# Patient Record
Sex: MALE | Race: OTHER
[De-identification: names, ages, dates, MRNs, and addresses within clinical notes are randomized per-mention and may not be internally consistent; named-entity substitution may affect disease eponyms.]

---

## 2021-08-16 ENCOUNTER — HOSPITAL ENCOUNTER (INPATIENT)
Dept: HOSPITAL 54 - ICU | Age: 86
LOS: 3 days | Discharge: HOME HEALTH | DRG: 280 | End: 2021-08-19
Attending: NURSE PRACTITIONER | Admitting: NURSE PRACTITIONER
Payer: MEDICARE

## 2021-08-16 VITALS — HEIGHT: 65 IN | BODY MASS INDEX: 25.85 KG/M2 | WEIGHT: 155.13 LBS

## 2021-08-16 DIAGNOSIS — L89.156: ICD-10-CM

## 2021-08-16 DIAGNOSIS — E78.5: ICD-10-CM

## 2021-08-16 DIAGNOSIS — I25.10: Primary | ICD-10-CM

## 2021-08-16 DIAGNOSIS — D63.8: ICD-10-CM

## 2021-08-16 DIAGNOSIS — I21.A1: ICD-10-CM

## 2021-08-16 DIAGNOSIS — F03.90: ICD-10-CM

## 2021-08-16 DIAGNOSIS — Z99.2: ICD-10-CM

## 2021-08-16 DIAGNOSIS — D63.1: ICD-10-CM

## 2021-08-16 DIAGNOSIS — J44.9: ICD-10-CM

## 2021-08-16 DIAGNOSIS — G93.41: ICD-10-CM

## 2021-08-16 DIAGNOSIS — N18.6: ICD-10-CM

## 2021-08-16 DIAGNOSIS — Z79.4: ICD-10-CM

## 2021-08-16 DIAGNOSIS — F17.210: ICD-10-CM

## 2021-08-16 DIAGNOSIS — I12.0: ICD-10-CM

## 2021-08-16 DIAGNOSIS — I42.9: ICD-10-CM

## 2021-08-16 DIAGNOSIS — Z95.5: ICD-10-CM

## 2021-08-16 DIAGNOSIS — E11.22: ICD-10-CM

## 2021-08-16 DIAGNOSIS — E83.9: ICD-10-CM

## 2021-08-16 PROCEDURE — C1894 INTRO/SHEATH, NON-LASER: HCPCS

## 2021-08-16 PROCEDURE — A6248 HYDROGEL DRSG GEL FILLER: HCPCS

## 2021-08-16 PROCEDURE — G0500 MOD SEDAT ENDO SERVICE >5YRS: HCPCS

## 2021-08-16 PROCEDURE — C1887 CATHETER, GUIDING: HCPCS

## 2021-08-16 PROCEDURE — G0378 HOSPITAL OBSERVATION PER HR: HCPCS

## 2021-08-16 PROCEDURE — A6403 STERILE GAUZE>16 <= 48 SQ IN: HCPCS

## 2021-08-17 VITALS — SYSTOLIC BLOOD PRESSURE: 125 MMHG | DIASTOLIC BLOOD PRESSURE: 56 MMHG

## 2021-08-17 VITALS — SYSTOLIC BLOOD PRESSURE: 120 MMHG | DIASTOLIC BLOOD PRESSURE: 53 MMHG

## 2021-08-17 VITALS — SYSTOLIC BLOOD PRESSURE: 111 MMHG | DIASTOLIC BLOOD PRESSURE: 23 MMHG

## 2021-08-17 VITALS — DIASTOLIC BLOOD PRESSURE: 53 MMHG | SYSTOLIC BLOOD PRESSURE: 122 MMHG

## 2021-08-17 VITALS — DIASTOLIC BLOOD PRESSURE: 52 MMHG | SYSTOLIC BLOOD PRESSURE: 107 MMHG

## 2021-08-17 VITALS — DIASTOLIC BLOOD PRESSURE: 91 MMHG | SYSTOLIC BLOOD PRESSURE: 147 MMHG

## 2021-08-17 VITALS — DIASTOLIC BLOOD PRESSURE: 37 MMHG | SYSTOLIC BLOOD PRESSURE: 120 MMHG

## 2021-08-17 VITALS — SYSTOLIC BLOOD PRESSURE: 118 MMHG | DIASTOLIC BLOOD PRESSURE: 73 MMHG

## 2021-08-17 VITALS — DIASTOLIC BLOOD PRESSURE: 60 MMHG | SYSTOLIC BLOOD PRESSURE: 120 MMHG

## 2021-08-17 VITALS — SYSTOLIC BLOOD PRESSURE: 104 MMHG | DIASTOLIC BLOOD PRESSURE: 60 MMHG

## 2021-08-17 VITALS — DIASTOLIC BLOOD PRESSURE: 37 MMHG | SYSTOLIC BLOOD PRESSURE: 94 MMHG

## 2021-08-17 VITALS — SYSTOLIC BLOOD PRESSURE: 128 MMHG | DIASTOLIC BLOOD PRESSURE: 54 MMHG

## 2021-08-17 VITALS — DIASTOLIC BLOOD PRESSURE: 71 MMHG | SYSTOLIC BLOOD PRESSURE: 119 MMHG

## 2021-08-17 VITALS — DIASTOLIC BLOOD PRESSURE: 75 MMHG | SYSTOLIC BLOOD PRESSURE: 118 MMHG

## 2021-08-17 VITALS — DIASTOLIC BLOOD PRESSURE: 36 MMHG | SYSTOLIC BLOOD PRESSURE: 122 MMHG

## 2021-08-17 VITALS — DIASTOLIC BLOOD PRESSURE: 59 MMHG | SYSTOLIC BLOOD PRESSURE: 151 MMHG

## 2021-08-17 VITALS — SYSTOLIC BLOOD PRESSURE: 138 MMHG | DIASTOLIC BLOOD PRESSURE: 71 MMHG

## 2021-08-17 VITALS — DIASTOLIC BLOOD PRESSURE: 60 MMHG | SYSTOLIC BLOOD PRESSURE: 103 MMHG

## 2021-08-17 VITALS — DIASTOLIC BLOOD PRESSURE: 51 MMHG | SYSTOLIC BLOOD PRESSURE: 128 MMHG

## 2021-08-17 VITALS — DIASTOLIC BLOOD PRESSURE: 52 MMHG | SYSTOLIC BLOOD PRESSURE: 116 MMHG

## 2021-08-17 VITALS — DIASTOLIC BLOOD PRESSURE: 51 MMHG | SYSTOLIC BLOOD PRESSURE: 115 MMHG

## 2021-08-17 VITALS — SYSTOLIC BLOOD PRESSURE: 138 MMHG | DIASTOLIC BLOOD PRESSURE: 62 MMHG

## 2021-08-17 VITALS — SYSTOLIC BLOOD PRESSURE: 115 MMHG | DIASTOLIC BLOOD PRESSURE: 39 MMHG

## 2021-08-17 VITALS — SYSTOLIC BLOOD PRESSURE: 151 MMHG | DIASTOLIC BLOOD PRESSURE: 59 MMHG

## 2021-08-17 VITALS — SYSTOLIC BLOOD PRESSURE: 128 MMHG | DIASTOLIC BLOOD PRESSURE: 51 MMHG

## 2021-08-17 VITALS — DIASTOLIC BLOOD PRESSURE: 41 MMHG | SYSTOLIC BLOOD PRESSURE: 100 MMHG

## 2021-08-17 VITALS — DIASTOLIC BLOOD PRESSURE: 49 MMHG | SYSTOLIC BLOOD PRESSURE: 120 MMHG

## 2021-08-17 VITALS — DIASTOLIC BLOOD PRESSURE: 87 MMHG | SYSTOLIC BLOOD PRESSURE: 137 MMHG

## 2021-08-17 VITALS — SYSTOLIC BLOOD PRESSURE: 122 MMHG | DIASTOLIC BLOOD PRESSURE: 52 MMHG

## 2021-08-17 VITALS — SYSTOLIC BLOOD PRESSURE: 130 MMHG | DIASTOLIC BLOOD PRESSURE: 61 MMHG

## 2021-08-17 VITALS — SYSTOLIC BLOOD PRESSURE: 108 MMHG | DIASTOLIC BLOOD PRESSURE: 50 MMHG

## 2021-08-17 VITALS — SYSTOLIC BLOOD PRESSURE: 127 MMHG | DIASTOLIC BLOOD PRESSURE: 43 MMHG

## 2021-08-17 VITALS — SYSTOLIC BLOOD PRESSURE: 115 MMHG | DIASTOLIC BLOOD PRESSURE: 38 MMHG

## 2021-08-17 VITALS — DIASTOLIC BLOOD PRESSURE: 61 MMHG | SYSTOLIC BLOOD PRESSURE: 115 MMHG

## 2021-08-17 LAB
ALBUMIN SERPL BCP-MCNC: 2.4 G/DL (ref 3.4–5)
ALP SERPL-CCNC: 76 U/L (ref 46–116)
ALT SERPL W P-5'-P-CCNC: 8 U/L (ref 12–78)
AST SERPL W P-5'-P-CCNC: 17 U/L (ref 15–37)
BASOPHILS # BLD AUTO: 0 K/UL (ref 0–0.2)
BASOPHILS NFR BLD AUTO: 0.3 % (ref 0–2)
BILIRUB SERPL-MCNC: 0.3 MG/DL (ref 0.2–1)
BUN SERPL-MCNC: 41 MG/DL (ref 7–18)
CALCIUM SERPL-MCNC: 8.6 MG/DL (ref 8.5–10.1)
CHLORIDE SERPL-SCNC: 103 MMOL/L (ref 98–107)
CHOLEST SERPL-MCNC: 81 MG/DL (ref ?–200)
CO2 SERPL-SCNC: 24 MMOL/L (ref 21–32)
CREAT SERPL-MCNC: 6.4 MG/DL (ref 0.6–1.3)
EOSINOPHIL NFR BLD AUTO: 1.4 % (ref 0–6)
GLUCOSE SERPL-MCNC: 102 MG/DL (ref 74–106)
HCT VFR BLD AUTO: 28 % (ref 39–51)
HDLC SERPL-MCNC: 24 MG/DL (ref 40–60)
HGB BLD-MCNC: 8.9 G/DL (ref 13.5–17.5)
LDLC SERPL DIRECT ASSAY-MCNC: 40 MG/DL (ref 0–99)
LYMPHOCYTES NFR BLD AUTO: 0.8 K/UL (ref 0.8–4.8)
LYMPHOCYTES NFR BLD AUTO: 12.3 % (ref 20–44)
MAGNESIUM SERPL-MCNC: 2 MG/DL (ref 1.8–2.4)
MCHC RBC AUTO-ENTMCNC: 32 G/DL (ref 31–36)
MCV RBC AUTO: 103 FL (ref 80–96)
MONOCYTES NFR BLD AUTO: 0.6 K/UL (ref 0.1–1.3)
MONOCYTES NFR BLD AUTO: 9.6 % (ref 2–12)
NEUTROPHILS # BLD AUTO: 4.7 K/UL (ref 1.8–8.9)
NEUTROPHILS NFR BLD AUTO: 76.4 % (ref 43–81)
PHOSPHATE SERPL-MCNC: 4.2 MG/DL (ref 2.5–4.9)
PLATELET # BLD AUTO: 156 K/UL (ref 150–450)
POTASSIUM SERPL-SCNC: 4.7 MMOL/L (ref 3.5–5.1)
PROT SERPL-MCNC: 6.2 G/DL (ref 6.4–8.2)
RBC # BLD AUTO: 2.7 MIL/UL (ref 4.5–6)
SODIUM SERPL-SCNC: 138 MMOL/L (ref 136–145)
TRIGL SERPL-MCNC: 97 MG/DL (ref 30–150)
TSH SERPL DL<=0.005 MIU/L-ACNC: 0.69 UIU/ML (ref 0.36–3.74)
WBC NRBC COR # BLD AUTO: 6.2 K/UL (ref 4.3–11)

## 2021-08-17 RX ADMIN — Medication SCH TAB: at 07:38

## 2021-08-17 RX ADMIN — RENAGEL SCH MG: 800 TABLET ORAL at 07:37

## 2021-08-17 RX ADMIN — ATORVASTATIN CALCIUM SCH MG: 40 TABLET, FILM COATED ORAL at 21:57

## 2021-08-17 RX ADMIN — FAMOTIDINE SCH MG: 20 TABLET, FILM COATED ORAL at 07:38

## 2021-08-17 RX ADMIN — METOPROLOL TARTRATE SCH MG: 50 TABLET, FILM COATED ORAL at 17:00

## 2021-08-17 RX ADMIN — METOPROLOL TARTRATE SCH MG: 50 TABLET, FILM COATED ORAL at 10:56

## 2021-08-17 RX ADMIN — RENAGEL SCH MG: 800 TABLET ORAL at 17:36

## 2021-08-17 RX ADMIN — RENAGEL SCH MG: 800 TABLET ORAL at 13:00

## 2021-08-17 RX ADMIN — DOCUSATE SODIUM SCH MG: 250 CAPSULE, LIQUID FILLED ORAL at 07:38

## 2021-08-17 RX ADMIN — Medication SCH OZ: at 10:56

## 2021-08-17 RX ADMIN — Medication SCH MG: at 10:55

## 2021-08-17 NOTE — NUR
RN NOTE



PATIENT ALERT AND RESPONSIVE, ON O2 2L VIA NASAL CANNULA. O2 SAT 98%. NO SIGNS OF ANY 
RESPIRATORY DISTRESS. KEPT CLEAN AND DRY. VOIDED X2. ALL NEEDS ATTENDED PROMPTLY. BED LOCKED 
AND IN LOWEST POSITION. CALL LIGHT WITHIN REACH. ENDORSED TO AM SHIFT AND ENDORSED REGARDING 
PATIENTS BELONGINGS (DENTURES, RIGHT EAR HEARING AID, CELLPHONE).

## 2021-08-17 NOTE — NUR
PT APPEARS TO BE COOPERATIVE AT THIS TIME.  RESTRAINTS REMOVED

-------------------------------------------------------------------------------

Addendum: 08/17/21 at 1733 by PRATIMA MORA RN

-------------------------------------------------------------------------------

Amended: Links added.

## 2021-08-17 NOTE — NUR
ADMIT NOTE



RECEIVED PATIENT VIA GURNEY ACCOMPANIED BY SHAREE SIMEON AND TWO EMT'S. PATIENT SAFELY TRANSFERRED 
TO BED. TELE MONITOR ON, HR 60'S. PATIENT IS ALERT AND ORIENTED X 1, CONFUSED. ON O2 2L VIA 
NASAL CANNULA, NO SIGNS OF ANY RESPIRATORY DISTRESS. DENIES ANY PAIN AT THIS TIME. ADMITTING 
DX: NSTEMI. HX: 2 STENTS RCA, ESRD (T,TH,SAT), CAD, HLD, AND SMOKER. WITH RIGHT CHEST 
PERMACATH, DRESSING DRY AND INTACT. SKIN ASSESSMENT DONE, NOTED WITH SCROTUM REDNESS, LEFT 
BUTTOCK REDNESS, SACRUM SCAB, LEFT UPPER ARM WOUND,   IV ACCESS ON RIGHT AC #20 INFUSING 
ANGIOMAX 15ML/HR. AND RIGHT HAND #22 PATENT AND INTACT. ALL ORDERS REVIEWED AND VERIFIED 
WITH DR. ROSAS, ORDERS TO CONTINUE CURRENT MEDS. BED LOCKED AND IN LOWEST POSITION. CALL 
LIGHT WITHIN REACH. ALL NEEDS ANTICIPATED. 

-------------------------------------------------------------------------------

Addendum: 08/17/21 at 0330 by MANUEL JAMES RN

-------------------------------------------------------------------------------

PATIENT ADMITTED AT 0010.

## 2021-08-17 NOTE — NUR
ICU/LVN

CALLED THE CARDIOLOGIST ABOUT TURNING OFF ANGIOMAX 2-3 HRS BEFORE PROCEDURE AT 1300. DR LAND SAID TO CALL THE CARDIOLOGIST TOMORROW TO ADDRESS THE TURNING OFF THE ANGIOMAX. 
WILL PASS ON TO DAY SHIFT ABOUT THIS.

## 2021-08-17 NOTE — NUR
OPENING NOTE:  REPORT RECEIVED FROM LARS SIMEON.  PT ATTEMPTED TO SLAP AND HIT RN MULTIPLE 
TIMES DURING ASSESSMENT OF IV SITE.  4 STAFF MEMBERS REQUIRED TO CALM PATIENT DOWN AND APPLY 
RESTRAINTS.  IV SITE WAS ASSESSED, COBAN DRESSING REMOVED FROM RIGHT WRIST AND FOREARM, 
APPEARED TO BE SNUG, PRESENT WHEN PATIENT ARRIVED FROM [facility] ACCORDING TO REPORT.  
WOUND CARE RN AT BEDSIDE ASSESSING WOUNDS WHILE OTHER STAFF AT BEDSIDE.  PT CHECKED ON 
HOURLY AND PRN BY NURSING STAFF.

## 2021-08-17 NOTE — NUR
RECEIVED PATIENT ON ANGIOMAX @ 15ML/HR. DR. ROSAS AWARE AND WITH ORDERS TO CONTINUE CURRENT 
MEDICATION. LORIE FROM [place] UNABLE TO VERIFY MEDICATION AND TO HAVE [facility] DAYSHIFT 
PHARMACY TO VERIFY MEDICATION. CHARGE NURSE JEFFREY AWARE.

## 2021-08-17 NOTE — NUR
END OF SHIFT NOTE:  PT IS SCHEDULED FOR HEART CATH AT 1300 TOMORROW.  PT ATE ALL OF DINNER 
WITH MINIMAL ASSISTANCE.  PT APPEARS COOPERATIVE AT THIS TIME.  PT CHECKED ON HOURLY AND PRN 
BY NURSING STAFF.

## 2021-08-17 NOTE — NUR
PT APPEARS TO BE CALMER AT THIS TIME.  PT WAS COOPERATIVE AT 1200 ROUNDS.  CONTINUE TO WAIT 
FOR DR HOOPER TO CONFIRM IF THE ANGIOMAX GTT SHOULD BE INFUSING OR NOW.  RN SPOKE TO PATIENTS 
SON ADÁN, TELEPHONE CONSENT RECEIVED FOR HEART CATH AND FOR ANESTHESIA FOR PROCEDURE.  WILL 
CONITNUE TO MONITOR PATIENT CLOSELY.

## 2021-08-17 NOTE — NUR
PER DR. GIBSON KEEP ANGIOMAX INFUSING AT 0.1MG/KG/HR TILL HEART CATH.  HEART CATH SCHEDULED 
FOR TOMORROW AT 1PM PER DR GIBSON.  DR. HOOPER NOTIFIED.  ORDERS GIVEN FOR DIET FOR PATIENT 
UNTIL MIDNIGHT.

## 2021-08-17 NOTE — NUR
WOUND CARE CONSULT: LIMITED ASSESSMENT DUE TO PT VERY COMBATIVE. LEFT UPPER ARM WOUND NOTED 
WITH SEROSANGUINOUS DRAINAGE. SACRAL WOUND NOTED IN ADMISSION PHOTO. SURGICAL CONSULT CALLED 
TO DR SINGH. RECOMMENDATIONS MADE FOR SKIN PROTECTION. DISCUSSED WITH NURSING STAFF. MD 
IN AGREEMENT WITH PLAN OF CARE.

## 2021-08-18 VITALS — SYSTOLIC BLOOD PRESSURE: 136 MMHG | DIASTOLIC BLOOD PRESSURE: 78 MMHG

## 2021-08-18 VITALS — SYSTOLIC BLOOD PRESSURE: 152 MMHG | DIASTOLIC BLOOD PRESSURE: 58 MMHG

## 2021-08-18 VITALS — SYSTOLIC BLOOD PRESSURE: 146 MMHG | DIASTOLIC BLOOD PRESSURE: 57 MMHG

## 2021-08-18 VITALS — DIASTOLIC BLOOD PRESSURE: 67 MMHG | SYSTOLIC BLOOD PRESSURE: 122 MMHG

## 2021-08-18 VITALS — DIASTOLIC BLOOD PRESSURE: 76 MMHG | SYSTOLIC BLOOD PRESSURE: 120 MMHG

## 2021-08-18 VITALS — DIASTOLIC BLOOD PRESSURE: 62 MMHG | SYSTOLIC BLOOD PRESSURE: 136 MMHG

## 2021-08-18 VITALS — DIASTOLIC BLOOD PRESSURE: 76 MMHG | SYSTOLIC BLOOD PRESSURE: 206 MMHG

## 2021-08-18 VITALS — DIASTOLIC BLOOD PRESSURE: 43 MMHG | SYSTOLIC BLOOD PRESSURE: 120 MMHG

## 2021-08-18 VITALS — DIASTOLIC BLOOD PRESSURE: 61 MMHG | SYSTOLIC BLOOD PRESSURE: 137 MMHG

## 2021-08-18 VITALS — DIASTOLIC BLOOD PRESSURE: 79 MMHG | SYSTOLIC BLOOD PRESSURE: 95 MMHG

## 2021-08-18 VITALS — DIASTOLIC BLOOD PRESSURE: 68 MMHG | SYSTOLIC BLOOD PRESSURE: 161 MMHG

## 2021-08-18 VITALS — DIASTOLIC BLOOD PRESSURE: 66 MMHG | SYSTOLIC BLOOD PRESSURE: 164 MMHG

## 2021-08-18 VITALS — SYSTOLIC BLOOD PRESSURE: 144 MMHG | DIASTOLIC BLOOD PRESSURE: 71 MMHG

## 2021-08-18 VITALS — SYSTOLIC BLOOD PRESSURE: 105 MMHG | DIASTOLIC BLOOD PRESSURE: 59 MMHG

## 2021-08-18 VITALS — SYSTOLIC BLOOD PRESSURE: 117 MMHG | DIASTOLIC BLOOD PRESSURE: 65 MMHG

## 2021-08-18 VITALS — SYSTOLIC BLOOD PRESSURE: 143 MMHG | DIASTOLIC BLOOD PRESSURE: 57 MMHG

## 2021-08-18 VITALS — SYSTOLIC BLOOD PRESSURE: 135 MMHG | DIASTOLIC BLOOD PRESSURE: 66 MMHG

## 2021-08-18 VITALS — SYSTOLIC BLOOD PRESSURE: 157 MMHG | DIASTOLIC BLOOD PRESSURE: 97 MMHG

## 2021-08-18 VITALS — SYSTOLIC BLOOD PRESSURE: 146 MMHG | DIASTOLIC BLOOD PRESSURE: 64 MMHG

## 2021-08-18 VITALS — SYSTOLIC BLOOD PRESSURE: 146 MMHG | DIASTOLIC BLOOD PRESSURE: 66 MMHG

## 2021-08-18 VITALS — SYSTOLIC BLOOD PRESSURE: 131 MMHG | DIASTOLIC BLOOD PRESSURE: 67 MMHG

## 2021-08-18 VITALS — DIASTOLIC BLOOD PRESSURE: 75 MMHG | SYSTOLIC BLOOD PRESSURE: 142 MMHG

## 2021-08-18 VITALS — DIASTOLIC BLOOD PRESSURE: 55 MMHG | SYSTOLIC BLOOD PRESSURE: 131 MMHG

## 2021-08-18 VITALS — SYSTOLIC BLOOD PRESSURE: 129 MMHG | DIASTOLIC BLOOD PRESSURE: 57 MMHG

## 2021-08-18 VITALS — SYSTOLIC BLOOD PRESSURE: 151 MMHG | DIASTOLIC BLOOD PRESSURE: 64 MMHG

## 2021-08-18 VITALS — SYSTOLIC BLOOD PRESSURE: 108 MMHG | DIASTOLIC BLOOD PRESSURE: 53 MMHG

## 2021-08-18 VITALS — SYSTOLIC BLOOD PRESSURE: 143 MMHG | DIASTOLIC BLOOD PRESSURE: 66 MMHG

## 2021-08-18 VITALS — SYSTOLIC BLOOD PRESSURE: 120 MMHG | DIASTOLIC BLOOD PRESSURE: 76 MMHG

## 2021-08-18 VITALS — SYSTOLIC BLOOD PRESSURE: 118 MMHG | DIASTOLIC BLOOD PRESSURE: 50 MMHG

## 2021-08-18 VITALS — SYSTOLIC BLOOD PRESSURE: 147 MMHG | DIASTOLIC BLOOD PRESSURE: 65 MMHG

## 2021-08-18 VITALS — SYSTOLIC BLOOD PRESSURE: 136 MMHG | DIASTOLIC BLOOD PRESSURE: 76 MMHG

## 2021-08-18 VITALS — SYSTOLIC BLOOD PRESSURE: 129 MMHG | DIASTOLIC BLOOD PRESSURE: 60 MMHG

## 2021-08-18 VITALS — DIASTOLIC BLOOD PRESSURE: 83 MMHG | SYSTOLIC BLOOD PRESSURE: 156 MMHG

## 2021-08-18 LAB
BASOPHILS # BLD AUTO: 0 K/UL (ref 0–0.2)
BASOPHILS NFR BLD AUTO: 0.2 % (ref 0–2)
BUN SERPL-MCNC: 55 MG/DL (ref 7–18)
CALCIUM SERPL-MCNC: 8.7 MG/DL (ref 8.5–10.1)
CHLORIDE SERPL-SCNC: 103 MMOL/L (ref 98–107)
CO2 SERPL-SCNC: 25 MMOL/L (ref 21–32)
CREAT SERPL-MCNC: 7.5 MG/DL (ref 0.6–1.3)
EOSINOPHIL NFR BLD AUTO: 1.7 % (ref 0–6)
GLUCOSE SERPL-MCNC: 129 MG/DL (ref 74–106)
HCT VFR BLD AUTO: 28 % (ref 39–51)
HGB BLD-MCNC: 8.8 G/DL (ref 13.5–17.5)
LYMPHOCYTES NFR BLD AUTO: 0.7 K/UL (ref 0.8–4.8)
LYMPHOCYTES NFR BLD AUTO: 11.5 % (ref 20–44)
MAGNESIUM SERPL-MCNC: 1.8 MG/DL (ref 1.8–2.4)
MCHC RBC AUTO-ENTMCNC: 32 G/DL (ref 31–36)
MCV RBC AUTO: 102 FL (ref 80–96)
MONOCYTES NFR BLD AUTO: 0.7 K/UL (ref 0.1–1.3)
MONOCYTES NFR BLD AUTO: 10.5 % (ref 2–12)
NEUTROPHILS # BLD AUTO: 4.8 K/UL (ref 1.8–8.9)
NEUTROPHILS NFR BLD AUTO: 76.1 % (ref 43–81)
PHOSPHATE SERPL-MCNC: 5.3 MG/DL (ref 2.5–4.9)
PLATELET # BLD AUTO: 159 K/UL (ref 150–450)
POTASSIUM SERPL-SCNC: 4.8 MMOL/L (ref 3.5–5.1)
RBC # BLD AUTO: 2.69 MIL/UL (ref 4.5–6)
SODIUM SERPL-SCNC: 140 MMOL/L (ref 136–145)
WBC NRBC COR # BLD AUTO: 6.3 K/UL (ref 4.3–11)

## 2021-08-18 PROCEDURE — B211YZZ FLUOROSCOPY OF MULTIPLE CORONARY ARTERIES USING OTHER CONTRAST: ICD-10-PCS | Performed by: INTERNAL MEDICINE

## 2021-08-18 PROCEDURE — B215YZZ FLUOROSCOPY OF LEFT HEART USING OTHER CONTRAST: ICD-10-PCS | Performed by: INTERNAL MEDICINE

## 2021-08-18 PROCEDURE — 5A1D70Z PERFORMANCE OF URINARY FILTRATION, INTERMITTENT, LESS THAN 6 HOURS PER DAY: ICD-10-PCS | Performed by: INTERNAL MEDICINE

## 2021-08-18 PROCEDURE — 4A023N7 MEASUREMENT OF CARDIAC SAMPLING AND PRESSURE, LEFT HEART, PERCUTANEOUS APPROACH: ICD-10-PCS | Performed by: INTERNAL MEDICINE

## 2021-08-18 RX ADMIN — DOCUSATE SODIUM SCH MG: 250 CAPSULE, LIQUID FILLED ORAL at 09:00

## 2021-08-18 RX ADMIN — METOPROLOL TARTRATE SCH MG: 50 TABLET, FILM COATED ORAL at 09:00

## 2021-08-18 RX ADMIN — RENAGEL SCH MG: 800 TABLET ORAL at 13:00

## 2021-08-18 RX ADMIN — Medication SCH MG: at 09:00

## 2021-08-18 RX ADMIN — CLOPIDOGREL BISULFATE SCH MG: 75 TABLET, FILM COATED ORAL at 15:40

## 2021-08-18 RX ADMIN — Medication SCH GM: at 11:46

## 2021-08-18 RX ADMIN — RENAGEL SCH MG: 800 TABLET ORAL at 17:15

## 2021-08-18 RX ADMIN — RENAGEL SCH MG: 800 TABLET ORAL at 08:00

## 2021-08-18 RX ADMIN — ATORVASTATIN CALCIUM SCH MG: 40 TABLET, FILM COATED ORAL at 22:07

## 2021-08-18 RX ADMIN — FAMOTIDINE SCH MG: 20 TABLET, FILM COATED ORAL at 09:00

## 2021-08-18 RX ADMIN — METOPROLOL TARTRATE SCH MG: 50 TABLET, FILM COATED ORAL at 17:15

## 2021-08-18 RX ADMIN — Medication SCH TAB: at 09:00

## 2021-08-18 RX ADMIN — Medication SCH OZ: at 11:45

## 2021-08-18 NOTE — NUR
RN NOTES

 STOP ANGIOMAX INFUSION  AT THIS TIME BECAUSE OF PATIENT GOING LEFT HEART ARTERERY/VENTRICLE 
ANGIOGRAM AT THIS TIME. PATIENT CONFUSED, A/O X1 , CONSENT WAS SIGNED VIA SON, BP -105/59, 
P-77, O2-98 ROOM AIR, R-22, T-97.7F, PATIENT REFUSING PAIN AT THIS  TIME.

## 2021-08-18 NOTE — NUR
rn notes

 seen patient a/o x1/2with confusion, and get irritable easily. patient NPO at this time 
because going cat lab scheduled procedure, infusing Anglomax 15ml/hr on right ac area 
intact. patient able to turn and reposition self in the bed, hard to follow direction. open 
fistula wound  on right  ua dressing intact. patient 161.1 lb on bed scale. no acute 
respiratory distress. plan  today 1300 cat lab, after getting  HD and transfer patient back 
to the [facility]. call light within  to reach. will monitoring.

## 2021-08-18 NOTE — NUR
rn notes

 patient back at this time from heart cath lab. patient awake, drowse because of sedation. 
vs taken bp- 131/67, p-67, o2-98 o2-2lnc, T-97.8. patient  refused pain. dressing intact on 
right groin area, soft surrounding, no bleeding noted, pulse is present.  per md order  do 
nor resume Angiomax.  keep patient flat within four hr,  check dressing, and  pulse. order 
taken and carried out.

## 2021-08-18 NOTE — NUR
RN NOTES

 DUE MEDICATION ADMINISTERED, , NEEDS ATTENDED  AND ANTICIPATED, PATIENT A/O X1, FORGETFUL. 
ASSIST EATING DINNER TOLERATED  40%. GET ORDER TO  TRANSFER  TELE UNIT.

## 2021-08-18 NOTE — NUR
TELE RN NOTES



PATIENT IN BED WITH EYES CLOSED, EASY TO AROUSE. TALKING IN ANOTHER LANGUAGE. NO S/S OF 
APPARENT DISTRESS. NOT EXHIBITING PAIN VIA FLACC. CARDIAC MONITOR READING SR 75 WITH PVC. NO 
FLUIDS RUNNING AT THIS TIME. SAFETY IN PLACE.WILL CONTINUE TO MONITOR.

## 2021-08-18 NOTE — NUR
TELE RN CLOSING NOTE



PATIENT RESTING IN BED. PATIENT IS A/O X1. PATIENT IS BREATHING EVENLY AND NONLABORED ON 
ROOM AIR. PATIENT IS NOT IN ANY ACUTE DISTRESS. PATIENT ON TELE MONITOR SHOWING NSR. PATIENT 
DOES NOT COMPLAIN OF PAIN AT THIS TIME. VITALS WNL. PATIENT HAS IV ACCESS TO RAC # 20 PATENT 
AND INTACT. PATIENT IS S/P CATHETERIZATION PROCEDURE TOLERATED WELL. PATIENT WAS ORIENTED TO 
THE ROOM AND HOW TO USE THE CALL LIGHT. SAFETY MEASURES IN PLACE BED LOW LOCKED CALL LIGHT 
WITHIN REACH SIDE RAILS UP XX2. WILL ENDORSE TO ONCOMING SHIFT.

## 2021-08-18 NOTE — NUR
RN NOTES

 PATIENT TRANSFERRED TO THE TELE UNIT ROOM 309 BED 1. PATIENT STABLE, NO ACUTE RESPIRATORY 
DISTRESS, VSS, REFUSED PAIN. BEDSIDE REPORT GIVEN RN FOLLOW PLAN OF CARE.

## 2021-08-18 NOTE — NUR
RN NOTES

 PATIENT RESTING, CHECKED RIGHT GROIN AREA, PULSE IS PRESENT, SOFT TO TOUCH SURROUNDING, NO 
BLEEDING NOTED. WILL MONITORING.

## 2021-08-18 NOTE — NUR
WOUND CARE FOLLOW UP: PT SEEN FOR SKIN ASSESSMENT AND NOTED TO HAVE INTACT DEEP TISSUE 
INJURY TO SACRUM AND LEFT UPPER ARM WOUND, PRESENT ON ADMISSION. DISCUSSED SKIN PROTECTION 
AND WOUND CARE RECOMMENDATIONS WITH NURSING STAFF AND SARINA HA CURRENTLY ON CASE. 
PT COOPERATIVE DURING ASSESSMENT BUT BEGAN SCREAMING AFTER ASSESSMENT. MD IN AGREEMENT WITH 
PLAN OF CARE. 

-------------------------------------------------------------------------------

Addendum: 08/18/21 at 0827 by IGNACIA VELAZQUEZ WNDNU

-------------------------------------------------------------------------------

Amended: Links added.

## 2021-08-19 VITALS — DIASTOLIC BLOOD PRESSURE: 98 MMHG | SYSTOLIC BLOOD PRESSURE: 141 MMHG

## 2021-08-19 VITALS — DIASTOLIC BLOOD PRESSURE: 79 MMHG | SYSTOLIC BLOOD PRESSURE: 149 MMHG

## 2021-08-19 VITALS — SYSTOLIC BLOOD PRESSURE: 141 MMHG | DIASTOLIC BLOOD PRESSURE: 98 MMHG

## 2021-08-19 VITALS — SYSTOLIC BLOOD PRESSURE: 164 MMHG | DIASTOLIC BLOOD PRESSURE: 60 MMHG

## 2021-08-19 RX ADMIN — DOCUSATE SODIUM SCH MG: 250 CAPSULE, LIQUID FILLED ORAL at 08:42

## 2021-08-19 RX ADMIN — Medication SCH TAB: at 08:42

## 2021-08-19 RX ADMIN — METOPROLOL TARTRATE SCH MG: 50 TABLET, FILM COATED ORAL at 08:43

## 2021-08-19 RX ADMIN — RENAGEL SCH MG: 800 TABLET ORAL at 13:00

## 2021-08-19 RX ADMIN — Medication SCH MG: at 08:42

## 2021-08-19 RX ADMIN — Medication SCH OZ: at 08:48

## 2021-08-19 RX ADMIN — METOPROLOL TARTRATE SCH MG: 50 TABLET, FILM COATED ORAL at 17:00

## 2021-08-19 RX ADMIN — Medication SCH APPLIC: at 08:47

## 2021-08-19 RX ADMIN — RENAGEL SCH MG: 800 TABLET ORAL at 08:43

## 2021-08-19 RX ADMIN — CLOPIDOGREL BISULFATE SCH MG: 75 TABLET, FILM COATED ORAL at 08:42

## 2021-08-19 RX ADMIN — FAMOTIDINE SCH MG: 20 TABLET, FILM COATED ORAL at 08:42

## 2021-08-19 NOTE — NUR
RN DISCHARGED NOTES



PT DISCHARGED HOME IN STABLE CONDITION. PT A/O X2-3 WITH MILD CONFUSION NOTED. V/S TAKEN , 
STABLE AND RECORDED. PHOTOS OF SKIN ISSUES TAKEN AND FILED IN HIS CHART. ALL BEGINNINGS 
CHECKED, COUNTED AND SIGNED BELONGINGS LIST FORM. IV ACCESS ON RAC G#20 REMOVED WITH NO 
ACTIVE BLEEDING NOTED, DRY PRESSURE DRESSING APPLIED TO SITE. NAME ARMBAND REMOVED.  SMOKING 
CESSATION EDUCATION GIVEN TO PT, VERBALIZED UNDERSTANDING.  PT LEFT UNIT  AT 1630 VIA 
WHEELCHAIR ACCOMPANIED BY ME AND JOCELYNE REY. PT'S SON NELLA IN THE LOBBY, DISCHARGE 
INSTRUCTIONS GIVEN TO HIM AND HE VERBALIZED UNDERSTANDING. CHARGE NURSE AWARE OF DISCHARGE

## 2021-08-19 NOTE — NUR
TELE RN OPENING NOTES



PATIENT RECEIVED AWAKE IN BED IN NO ACUTE SIGNS OF DISTRESS. HOB ELEVATED. A/O X2. ABLE TO 
MAKE NEEDS KNOWN. ANXIOUS AND EASILY GETS ANGRY. ON ROOM AIR, BREATHING EVEN AND UNLABORED. 
ON TELE MONITOR WITH CURRENT READING OF NSR, HR ON THE 80'S, NO C/O CARDIAC DISTRESS VOICED. 
RIGHT SUBCLAVIAN HD CATH IN PLACE WITH DRESSING C/D/I. IV SL ON RAC G# 20 INTACT AND PATENT. 
SAFETY MEASURES IN PLACE: BED IS LOW AND WHEELS LOCKED, SIDE RAILS UP X2 AND CALL LIGHT 
WITHIN REACH. WILL CONTINUE TO MONITOR

## 2021-08-19 NOTE — NUR
tele rn notes



patient refusing wound treatment on his left upper arm. patient gets aggressive and hostile 
when touching L. arm. per patient doctor told him not to touch.

## 2021-11-27 ENCOUNTER — HOSPITAL ENCOUNTER (INPATIENT)
Dept: HOSPITAL 54 - ER | Age: 86
LOS: 4 days | Discharge: NURSING, REHAB OR LONG-TERM CARE | DRG: 640 | End: 2021-12-01
Attending: NURSE PRACTITIONER | Admitting: INTERNAL MEDICINE
Payer: MEDICARE

## 2021-11-27 VITALS — SYSTOLIC BLOOD PRESSURE: 143 MMHG | DIASTOLIC BLOOD PRESSURE: 57 MMHG

## 2021-11-27 VITALS — WEIGHT: 164 LBS | HEIGHT: 65 IN | BODY MASS INDEX: 27.32 KG/M2

## 2021-11-27 DIAGNOSIS — F17.210: ICD-10-CM

## 2021-11-27 DIAGNOSIS — Z79.4: ICD-10-CM

## 2021-11-27 DIAGNOSIS — E87.79: Primary | ICD-10-CM

## 2021-11-27 DIAGNOSIS — J44.9: ICD-10-CM

## 2021-11-27 DIAGNOSIS — L89.153: ICD-10-CM

## 2021-11-27 DIAGNOSIS — I42.9: ICD-10-CM

## 2021-11-27 DIAGNOSIS — Z99.2: ICD-10-CM

## 2021-11-27 DIAGNOSIS — D63.1: ICD-10-CM

## 2021-11-27 DIAGNOSIS — I21.A1: ICD-10-CM

## 2021-11-27 DIAGNOSIS — I12.0: ICD-10-CM

## 2021-11-27 DIAGNOSIS — F03.90: ICD-10-CM

## 2021-11-27 DIAGNOSIS — N17.0: ICD-10-CM

## 2021-11-27 DIAGNOSIS — Z95.5: ICD-10-CM

## 2021-11-27 DIAGNOSIS — E83.9: ICD-10-CM

## 2021-11-27 DIAGNOSIS — E11.649: ICD-10-CM

## 2021-11-27 DIAGNOSIS — E11.22: ICD-10-CM

## 2021-11-27 DIAGNOSIS — I25.10: ICD-10-CM

## 2021-11-27 DIAGNOSIS — E78.5: ICD-10-CM

## 2021-11-27 DIAGNOSIS — L89.323: ICD-10-CM

## 2021-11-27 DIAGNOSIS — Z20.822: ICD-10-CM

## 2021-11-27 DIAGNOSIS — E44.1: ICD-10-CM

## 2021-11-27 DIAGNOSIS — E87.5: ICD-10-CM

## 2021-11-27 DIAGNOSIS — N18.6: ICD-10-CM

## 2021-11-27 LAB
ALBUMIN SERPL BCP-MCNC: 2.3 G/DL (ref 3.4–5)
ALP SERPL-CCNC: 82 U/L (ref 46–116)
ALT SERPL W P-5'-P-CCNC: 6 U/L (ref 12–78)
AST SERPL W P-5'-P-CCNC: 11 U/L (ref 15–37)
BASOPHILS # BLD AUTO: 0.1 K/UL (ref 0–0.2)
BASOPHILS NFR BLD AUTO: 1.1 % (ref 0–2)
BILIRUB DIRECT SERPL-MCNC: 0.1 MG/DL (ref 0–0.2)
BILIRUB SERPL-MCNC: 0.4 MG/DL (ref 0.2–1)
BUN SERPL-MCNC: 59 MG/DL (ref 7–18)
CALCIUM SERPL-MCNC: 8.9 MG/DL (ref 8.5–10.1)
CHLORIDE SERPL-SCNC: 107 MMOL/L (ref 98–107)
CO2 SERPL-SCNC: 24 MMOL/L (ref 21–32)
CREAT SERPL-MCNC: 8.5 MG/DL (ref 0.6–1.3)
EOSINOPHIL NFR BLD AUTO: 0.6 % (ref 0–6)
GLUCOSE SERPL-MCNC: 84 MG/DL (ref 74–106)
HCT VFR BLD AUTO: 38 % (ref 39–51)
HGB BLD-MCNC: 10.7 G/DL (ref 13.5–17.5)
LYMPHOCYTES NFR BLD AUTO: 0.8 K/UL (ref 0.8–4.8)
LYMPHOCYTES NFR BLD AUTO: 8.8 % (ref 20–44)
LYMPHOCYTES NFR BLD MANUAL: 6 % (ref 16–48)
MCHC RBC AUTO-ENTMCNC: 28 G/DL (ref 31–36)
MCV RBC AUTO: 102 FL (ref 80–96)
MONOCYTES NFR BLD AUTO: 0.8 K/UL (ref 0.1–1.3)
MONOCYTES NFR BLD AUTO: 8.1 % (ref 2–12)
MONOCYTES NFR BLD MANUAL: 6 % (ref 0–11)
NEUTROPHILS # BLD AUTO: 7.6 K/UL (ref 1.8–8.9)
NEUTROPHILS NFR BLD AUTO: 81.4 % (ref 43–81)
NEUTS BAND NFR BLD MANUAL: 4 % (ref 0–5)
NEUTS SEG NFR BLD MANUAL: 84 % (ref 42–76)
PLATELET # BLD AUTO: 176 K/UL (ref 150–450)
POTASSIUM SERPL-SCNC: 5.7 MMOL/L (ref 3.5–5.1)
PROT SERPL-MCNC: 6.3 G/DL (ref 6.4–8.2)
RBC # BLD AUTO: 3.69 MIL/UL (ref 4.5–6)
SODIUM SERPL-SCNC: 143 MMOL/L (ref 136–145)
WBC NRBC COR # BLD AUTO: 9.4 K/UL (ref 4.3–11)

## 2021-11-27 PROCEDURE — A6248 HYDROGEL DRSG GEL FILLER: HCPCS

## 2021-11-27 PROCEDURE — C9803 HOPD COVID-19 SPEC COLLECT: HCPCS

## 2021-11-27 PROCEDURE — G0378 HOSPITAL OBSERVATION PER HR: HCPCS

## 2021-11-27 NOTE — NUR
PT [id number], from home, missed 2 HD c/o weakness. HD AV FISTULA TO SALO; NO 
ACTIVE BLEEDING NOTED. PT A/OX2. TOLERATING R/A WELL AT 98% WITH NO SOB. BLE 
PITTING EDEMA +2. CONNECTED PT TO POX AND MONITOR.

## 2021-11-27 NOTE — NUR
ADMITTING NOTES:

NEW ADMISSION CAME IN AT 2222, 86 Y.O., MALE, CAME IN VIA GURNEY ACCOMPANIED BY 2 STAFF IN 
CARDIAC MONITOR FROM ER, CONTINUE ON TELE-MONITOR, SR-RATE 83, ON ROOM AIR, A/O X1 ONLY, 
LOOKS CONFUSED, HE SPEAKS Sinhala AND ABLE TO COMMUNICATE WITH CNA IN Bolivian BUT LIMITED 
ONLY, CAN UNDERSTAND VERY LITTLE ENGLISH,CAME IN DUE TO WEAKNESS(HE MISSED 2 HEMODIALYSIS 
SESSION) HE HAS SALO-FISTULA (+) BRUIT AND (+)THRILL, HE HAD HD CATH ON THE RU, PER 
ENDORSEMENT LEFT FISTULA IS NOT WORKING,KNOWN CASE OF CAD  S/P LEFT  HEART CATH WITH  PTCA 
AND STENTING RCA ON 2020, NO SOB OR ANY SIGN OF RESPIRATORY DISCOMFORT,WHILE DOING SKIN 
ASSESSMENT HE HAD A LARGE BOWEL MOVEMENT(HE RECEIVED KAYEXALATE IN ER).

BODY ASSESSMENT DONE:

1)SALO-FISTULA     RUC-HD CATH       LEFT ARM FISTULA-NOT WORKING

2) IV SITE:LFA G#20

3)EDEMA PITTING: RIGHT UPPER ARM +++  LEFT UPPER ARM +++  

  RIGHT LOWER EXTREMITY ++++       LEFT UPPER EXTREMITY ++++

-BOTH UPPER AND LOWER EXTREMITY ELEVATED WITH PILLOWS

4)RIGHT SHIN OLD/BLACK SCAB

5)RIGHT AND LEFT GROIN REDNESS

6)OLD BEDSORE RE OPENON THE SACRUM AREA AROUND 1CMX0.8CM SURROUNDED BY NON BLANCHABLE 
REDNESS

7)SKIN DISCOLORATION ON THE LEFT HIP

8)OLD SURGICAL SITE:RLE, CHEST AREA

-ORIENTED TO UNIT AND STAFF, FALL, SAFETY AND ASPIRATION PRECAUTION OBSERVED, CLEANED AND 
BRIEF CHANGE. NO PAIN OR DISCOMFORT.

## 2021-11-28 VITALS — SYSTOLIC BLOOD PRESSURE: 127 MMHG | DIASTOLIC BLOOD PRESSURE: 62 MMHG

## 2021-11-28 VITALS — DIASTOLIC BLOOD PRESSURE: 57 MMHG | SYSTOLIC BLOOD PRESSURE: 143 MMHG

## 2021-11-28 VITALS — SYSTOLIC BLOOD PRESSURE: 140 MMHG | DIASTOLIC BLOOD PRESSURE: 58 MMHG

## 2021-11-28 VITALS — DIASTOLIC BLOOD PRESSURE: 72 MMHG | SYSTOLIC BLOOD PRESSURE: 127 MMHG

## 2021-11-28 VITALS — DIASTOLIC BLOOD PRESSURE: 65 MMHG | SYSTOLIC BLOOD PRESSURE: 147 MMHG

## 2021-11-28 VITALS — SYSTOLIC BLOOD PRESSURE: 147 MMHG | DIASTOLIC BLOOD PRESSURE: 62 MMHG

## 2021-11-28 LAB
BASOPHILS # BLD AUTO: 0.1 K/UL (ref 0–0.2)
BASOPHILS NFR BLD AUTO: 0.8 % (ref 0–2)
BUN SERPL-MCNC: 63 MG/DL (ref 7–18)
CALCIUM SERPL-MCNC: 8.9 MG/DL (ref 8.5–10.1)
CHLORIDE SERPL-SCNC: 108 MMOL/L (ref 98–107)
CO2 SERPL-SCNC: 23 MMOL/L (ref 21–32)
CREAT SERPL-MCNC: 8.9 MG/DL (ref 0.6–1.3)
EOSINOPHIL NFR BLD AUTO: 0.7 % (ref 0–6)
GLUCOSE SERPL-MCNC: 64 MG/DL (ref 74–106)
HCT VFR BLD AUTO: 36 % (ref 39–51)
HGB BLD-MCNC: 10.2 G/DL (ref 13.5–17.5)
LYMPHOCYTES NFR BLD AUTO: 0.9 K/UL (ref 0.8–4.8)
LYMPHOCYTES NFR BLD AUTO: 10.7 % (ref 20–44)
MAGNESIUM SERPL-MCNC: 2.1 MG/DL (ref 1.8–2.4)
MCHC RBC AUTO-ENTMCNC: 29 G/DL (ref 31–36)
MCV RBC AUTO: 101 FL (ref 80–96)
MONOCYTES NFR BLD AUTO: 0.6 K/UL (ref 0.1–1.3)
MONOCYTES NFR BLD AUTO: 7.7 % (ref 2–12)
NEUTROPHILS # BLD AUTO: 6.5 K/UL (ref 1.8–8.9)
NEUTROPHILS NFR BLD AUTO: 80.1 % (ref 43–81)
PHOSPHATE SERPL-MCNC: 5.5 MG/DL (ref 2.5–4.9)
PLATELET # BLD AUTO: 192 K/UL (ref 150–450)
POTASSIUM SERPL-SCNC: 5.9 MMOL/L (ref 3.5–5.1)
RBC # BLD AUTO: 3.52 MIL/UL (ref 4.5–6)
SODIUM SERPL-SCNC: 144 MMOL/L (ref 136–145)
TSH SERPL DL<=0.005 MIU/L-ACNC: 1.15 UIU/ML (ref 0.36–3.74)
WBC NRBC COR # BLD AUTO: 8.1 K/UL (ref 4.3–11)

## 2021-11-28 RX ADMIN — TAMSULOSIN HYDROCHLORIDE SCH MG: 0.4 CAPSULE ORAL at 22:45

## 2021-11-28 RX ADMIN — METOPROLOL TARTRATE SCH MG: 25 TABLET, FILM COATED ORAL at 16:10

## 2021-11-28 RX ADMIN — INSULIN HUMAN SCH UNITS: 100 INJECTION, SOLUTION PARENTERAL at 17:30

## 2021-11-28 RX ADMIN — ATORVASTATIN CALCIUM SCH MG: 40 TABLET, FILM COATED ORAL at 22:45

## 2021-11-28 RX ADMIN — PANTOPRAZOLE SODIUM SCH MG: 40 TABLET, DELAYED RELEASE ORAL at 09:13

## 2021-11-28 RX ADMIN — VALSARTAN SCH MG: 80 TABLET ORAL at 16:10

## 2021-11-28 RX ADMIN — INSULIN GLARGINE SCH UNIT: 100 INJECTION, SOLUTION SUBCUTANEOUS at 22:00

## 2021-11-28 NOTE — NUR
RN NOTE



PATIENT WAS TRANSFERRED TO ROOM 323-1 VIA BED. VS /63 NV 78 RR 18 T 97.7 SA02 96% 
Occitan SPEAKING. ON ROOM AIR, DENIES SOB. IN NO APPARENT DISTRESS. IV ACCESS ON L FA #20G, 
INTACT AND PATENT. SALO FISTULA, THRILL AND BRUIT PRESENT. R CW PERMACATH C/D/I. MALINI FISTULA 
NOTED, NOT WORKING. SAFETY MEASURES MAINTAINED. BED IN LOWEST POSITION, BRAKES LOCKED. SIDE 
RAILS UP X2. CALL LIGHT WITHIN REACH. WILL CONTINUE PLAN OF CARE.

## 2021-11-28 NOTE — NUR
RN NOTES:

AROUND 2150 RECEIVED ENDORSMENT FROM CRISELDA/RN/ER,HE IS 86 Y.O., MALE, BROUGHT BY AMBULANCE 
FROM HOME, WITH CHIEF COMPLAINED OF :WEAKNESS ,HE MISSED 2 HD SESSION. A/OX1-2,Maltese 
SPEAKING, LITTLE ENGLISH, LOOKS CONFUSED SECONDARY TO DEMENTIA,KNOWN CASE HTN/DM/CAD, BLOOD 
TEST DONE K-5.7, KAYEXALATE 30 GRAM GIVEN, , NO IV FLUIDS, IV CANNULA ON LFA G#20, PATENT, 
GIVEN ASPIRIN 325 MG IN ER, , ON SINUS RHYTHM RATE=73, FOR TELE MONITORING, CXR-CARDIOMEGALY 
WITH PULMONARY VASCULAR CONGESTION,BED RIDDEN, SKIN ISSUES:OLD SACRUM ULCER WITH EDEMA 
BUE,BLE.

-AWAITING FOR ARRIVAL WITHIN 30 MINUTES.

## 2021-11-28 NOTE — NUR
MS CAILIN OPENING NOTES



RECEIVED PT AWAKE, ALERT WITH NO S/SX OF RESPIRATORY DISTRESS. PT IS Welsh SPEAKING AND 
ON ROOM AIR RESTING COMFORTABLY IN SIDE LYING POSITION. PT HAS A LFA 20G SL FLUSHED, PATENT 
AND IN TACT. PT WILL BE TRANSFERRING TO [facility] ROOM 320-1. SAFETY MEASURES IN PLACE WITH BED 
IN LOWEST LOCKED POSITION, SIDE RAILS UP X2, AND CALL LIGHT WITHIN REACH.

-------------------------------------------------------------------------------

Addendum: 11/28/21 at 0817 by MAGUE MORENO RN

-------------------------------------------------------------------------------

PT IS GOING -2. REPORT GIVEN TO OVI

## 2021-11-28 NOTE — NUR
RN NOTES:

HE HAD HIS 2ND BOWEL MOVEMENT AT AROUND 0230,STOOL SEMI SOFT WITH SOME WATERY IN 
CONSISTENCY, CLEAN AND CHANGE.

## 2021-11-28 NOTE — NUR
TELE RN NOTE



PATIENT BLOOD SUGAR  MG/DL HELD LANTUS AND PATIENT REFUSED. NO SIGNS AND SYMPTOMS OF 
HYPOGLYCEMIA. WILL CONTINUE TO MONITOR.

## 2021-11-28 NOTE — NUR
RN NOTES:

--LEIDY NOTIFIED RN THAT PATIENT REFUSED FOR TROPONIN BLOOD TEST.

-SHE HAD A TROPONIN TEST AT 2300 RESULT WITHIN NORMAL RANGE.


-------------------------------------------------------------------------------

Addendum: 11/28/21 at 0038 by LARS SALAZAR RN

-------------------------------------------------------------------------------

ADDED  NOTES:

CN/RN NOTIFIED OF THE REFUSAL.

## 2021-11-28 NOTE — NUR
RN CLOSING NOTE



PATIENT RESTING IN BED. ON ROOM AIR, DENIES SOB. NO S/S OF RESPIRATORY DISTRESS. IV ACCESS 
ON L FA #20G, INTACT AND PATENT. SALO FISTULA, THRILL AND BRUIT PRESENT. RCW PERMACATH C/D/I. 
MALINI FISTULA NOTED, NOT WORKING. DUE MEDS GIVEN AS ORDERED. ALL NEEDS HAVE BEEN MET AND 
ATTENDED. SAFETY MEASURES MAINTAINED. BED IN LOWEST POSITION, BRAKES LOCKED. SIDE RAILS UP 
X2. KEPT CALL LIGHT WITHIN REACH. WILL ENDORSE CONTINUITY OF CARE TO ONCOMING SHIFT. .

## 2021-11-28 NOTE — NUR
Tele RN notes

Spoke with Heidy Fuentes regarding meds Uloric. Informed to bring Uloric meds tomorrow 
morning. Per am nurse, pharmacy doesn't carry uloric meds. Heidy verbalized understanding 
and will bring the meds and appreciate the info.

## 2021-11-28 NOTE — NUR
TELE RN OPENING NOTES 



RECEIVED PATIENT LAYING AWAKE IN BED. A/O X2. PATIENT WITH REGULAR AND UNLABORED BREATHING 
ON ROOM AIR, TOLERATED WELL. NO SIGNS AND SYMPTOMS OF DISTRESS NOTED. NO COMPLAIN OF PAIN OR 
DISCOMFORT AT THIS TIME. S.R. @ 69 BPM. IV ACCESS LFA G #20 SL. ACCESS PATENT AND INTACT. 
SALO AV FISTULA SAFETY PRECAUTIONS ENFORCED WITH BED LOCKKED AND AT LOWEST POSITION SIDERAILS 
UP X2. CALL LIGHT WITHIN REACH AT ALL TIES. WILL CONTINUE TO MONITOR PATIENT.

-------------------------------------------------------------------------------

Addendum: 11/29/21 at 0550 by JÚNIOR RANDOLPH RN

-------------------------------------------------------------------------------

PATIENT REFUSED TELE MONITOR EXPLAINED REASON BENEFIT AND OFFERED MULTIPLE TIMES.

## 2021-11-28 NOTE — NUR
RN NOTES:

PATIENT ABLE TO SLEEP AND REST, NO SIGN OF RESPIRATORY DISTRESS, KEPT ON CLOSE WATCH, 
ENDORSED FOR CONTINUITY OF CARE, TO F/U HD SCHEDULE, TO F/U MED RECON AND VACCINE RECORD OF 
THE PATIENT WITH THE FAMILY, FALL AND SAFETY PRECAUTION OBSERVED.

## 2021-11-29 VITALS — SYSTOLIC BLOOD PRESSURE: 118 MMHG | DIASTOLIC BLOOD PRESSURE: 51 MMHG

## 2021-11-29 VITALS — SYSTOLIC BLOOD PRESSURE: 124 MMHG | DIASTOLIC BLOOD PRESSURE: 76 MMHG

## 2021-11-29 VITALS — DIASTOLIC BLOOD PRESSURE: 58 MMHG | SYSTOLIC BLOOD PRESSURE: 146 MMHG

## 2021-11-29 VITALS — SYSTOLIC BLOOD PRESSURE: 119 MMHG | DIASTOLIC BLOOD PRESSURE: 52 MMHG

## 2021-11-29 VITALS — SYSTOLIC BLOOD PRESSURE: 122 MMHG | DIASTOLIC BLOOD PRESSURE: 52 MMHG

## 2021-11-29 VITALS — DIASTOLIC BLOOD PRESSURE: 61 MMHG | SYSTOLIC BLOOD PRESSURE: 139 MMHG

## 2021-11-29 VITALS — DIASTOLIC BLOOD PRESSURE: 65 MMHG | SYSTOLIC BLOOD PRESSURE: 158 MMHG

## 2021-11-29 LAB
BASOPHILS # BLD AUTO: 0 K/UL (ref 0–0.2)
BASOPHILS NFR BLD AUTO: 0.6 % (ref 0–2)
BUN SERPL-MCNC: 71 MG/DL (ref 7–18)
CALCIUM SERPL-MCNC: 8.2 MG/DL (ref 8.5–10.1)
CHLORIDE SERPL-SCNC: 103 MMOL/L (ref 98–107)
CO2 SERPL-SCNC: 22 MMOL/L (ref 21–32)
CREAT SERPL-MCNC: 9.8 MG/DL (ref 0.6–1.3)
EOSINOPHIL NFR BLD AUTO: 0.7 % (ref 0–6)
EOSINOPHIL NFR BLD MANUAL: 1 % (ref 0–4)
GLUCOSE SERPL-MCNC: 105 MG/DL (ref 74–106)
HCT VFR BLD AUTO: 30 % (ref 39–51)
HGB BLD-MCNC: 8.9 G/DL (ref 13.5–17.5)
LYMPHOCYTES NFR BLD AUTO: 0.7 K/UL (ref 0.8–4.8)
LYMPHOCYTES NFR BLD AUTO: 11.7 % (ref 20–44)
LYMPHOCYTES NFR BLD MANUAL: 18 % (ref 16–48)
MAGNESIUM SERPL-MCNC: 1.9 MG/DL (ref 1.8–2.4)
MCHC RBC AUTO-ENTMCNC: 29 G/DL (ref 31–36)
MCV RBC AUTO: 100 FL (ref 80–96)
MONOCYTES NFR BLD AUTO: 0.6 K/UL (ref 0.1–1.3)
MONOCYTES NFR BLD AUTO: 9.3 % (ref 2–12)
MONOCYTES NFR BLD MANUAL: 7 % (ref 0–11)
NEUTROPHILS # BLD AUTO: 4.9 K/UL (ref 1.8–8.9)
NEUTROPHILS NFR BLD AUTO: 77.7 % (ref 43–81)
NEUTS SEG NFR BLD MANUAL: 74 % (ref 42–76)
PHOSPHATE SERPL-MCNC: 5.7 MG/DL (ref 2.5–4.9)
PLATELET # BLD AUTO: 151 K/UL (ref 150–450)
POTASSIUM SERPL-SCNC: 5.4 MMOL/L (ref 3.5–5.1)
RBC # BLD AUTO: 3.03 MIL/UL (ref 4.5–6)
SODIUM SERPL-SCNC: 136 MMOL/L (ref 136–145)
WBC NRBC COR # BLD AUTO: 6.4 K/UL (ref 4.3–11)

## 2021-11-29 PROCEDURE — 5A1D70Z PERFORMANCE OF URINARY FILTRATION, INTERMITTENT, LESS THAN 6 HOURS PER DAY: ICD-10-PCS | Performed by: INTERNAL MEDICINE

## 2021-11-29 RX ADMIN — INSULIN HUMAN SCH UNITS: 100 INJECTION, SOLUTION PARENTERAL at 12:00

## 2021-11-29 RX ADMIN — ATORVASTATIN CALCIUM SCH MG: 40 TABLET, FILM COATED ORAL at 22:56

## 2021-11-29 RX ADMIN — Medication SCH MG: at 08:49

## 2021-11-29 RX ADMIN — Medication SCH EACH: at 22:00

## 2021-11-29 RX ADMIN — INSULIN HUMAN SCH UNITS: 100 INJECTION, SOLUTION PARENTERAL at 06:34

## 2021-11-29 RX ADMIN — ASPIRIN 81 MG SCH MG: 81 TABLET ORAL at 08:49

## 2021-11-29 RX ADMIN — VALSARTAN SCH MG: 80 TABLET ORAL at 08:50

## 2021-11-29 RX ADMIN — Medication SCH EACH: at 22:56

## 2021-11-29 RX ADMIN — METOPROLOL TARTRATE SCH MG: 25 TABLET, FILM COATED ORAL at 08:49

## 2021-11-29 RX ADMIN — VALSARTAN SCH MG: 80 TABLET ORAL at 17:00

## 2021-11-29 RX ADMIN — METOPROLOL TARTRATE SCH MG: 25 TABLET, FILM COATED ORAL at 17:00

## 2021-11-29 RX ADMIN — TAMSULOSIN HYDROCHLORIDE SCH MG: 0.4 CAPSULE ORAL at 22:56

## 2021-11-29 RX ADMIN — INSULIN GLARGINE SCH UNIT: 100 INJECTION, SOLUTION SUBCUTANEOUS at 22:00

## 2021-11-29 RX ADMIN — Medication SCH GM: at 15:59

## 2021-11-29 RX ADMIN — PANTOPRAZOLE SODIUM SCH MG: 40 TABLET, DELAYED RELEASE ORAL at 08:49

## 2021-11-29 RX ADMIN — Medication SCH EACH: at 17:09

## 2021-11-29 NOTE — NUR
SS Consult:



SS consult for pressure sores from home.

Pt. Is an 86-year-old male. Pt. speaks Bolivian, SW contact interrupter using the blue 
phone. Interrupter ID: [id number]. There was a language barrier and pt. were hard of hearing. 
Interrupter had to repeat herself multiple times. SW was able to get some questions 
answered.

Pt. demonstrates adequate insight to the reason for hospitalization. 

Per pt., he was brought to hospital by his son Heidy [[phone]]. 

Pt. was oriented x3, and alert. During interview, pt. was capable of following directions, 
but was hard of hearing. 

SW explored pt.s living situation. Per pt., he told interrupter that he lives in a 
facility, but according to nurse, Dudley, pt. mentioned he lives with family [[address] [place], [address]]. Per pt., he has a caregiver. No further information was 
provided due to hard of hearing.



Plan: SW provided available senior resources and pt. accepted.



Resources Provided:



ABUSE PREVENTION: 

ELDER ABUSE HOTLINE (24/7) [phone]

ADULT PROTECTIVE SERVICES HOTLINE [phone]

LONG-TERM CARE [facility] [phone]  [facility]

AREA ON AGING (HOTLINE) [phone]



ADULT DAY HEALTH CARE CARE CENTERS: 

Private pay or [facility] funded adult day care

 [facility] [phone]

 [facility] [phone], [facility] Integration Services ([phone], [facility] [phone], [facility] Adult Day Health Care [phone], [facility] Adult Day Health Care [phone], [facility] Adult  Center [phone], [place]

[facility] [phone], [facility] [phone], [place]



ALZHEIMERS DISEASE/DEMENTIA: 

Alzheimers Association Helpline (285) 892-2262

 [facility] (003) 871-2986 www.alz.org/[facility] Department of Aging (447) 630-9155 www.lacity.org

 Family Caregiver [place] (953) 337-5699 www.caregiver.org

 [place] Caregiver Resources Center/Family Support (156) 932-8924 www.[facility].org

CANCER RESOURCES: 

American Cancer Society (298) 530-4023 www.cancer.org

 Cancer Support Community (630) 178-4935 www.CancerSupportVvsb.org: CancerCare (166) 641-1411 www.cancercare.org

 [facility] Cancer Support Sells (242) 366-9718 www."[employer]".org



[facility] HEALTH ASSOCIATIONS:

AARP (839) 236-0707 www.aarp.org

 ALS Association (200) 683-0998(532) 602-3852 (318) 158-5710 (ask for Mag) www.als.org

 American Diabetes Association (665) 250-9229 www.diabetes.org

 American Heart Association (033) 144-5712 www.heart.org

 American Lung Association (778) 655-7166 www.lungusa.org

 American Parkinson Disease Association (223) 394-4121 www.apdaparkinson.org

 [facility] [place] (307) 211-7001, (965) 427-6420 www.redcross.org

 Arthritis Foundation (319) 883-9200 www.arthritis.org

 Crohns & Colitis Foundation of American (039) 689-6531 www.ccfa.org/chapters/osito

 National Multiple Sclerosis Society (238) 281-4964 www.nationalmssociety.org

 Myasthenia Gravis Foundation (469) 982-7350 www.myasthenia-ca.org

 National Stroke Association (112) 858-5290 www.stroke.org



CONSERVATORSHIP & GUARDIANSHIP:

AARP (006) 267-1687

 [place] Legal Services (715) 376-4683

 Center for Health Care Rights (960) 495-7101

 Eldercare Information and Referral (215) 427-0597

  Foundation (271) 597-4833



[facility]: 

[facility] Bar Referral Service (595) 049-9015

[facility] Legal Services (216) [phone]

Office of the Public Guardian [place] (428) 231-3084



EYESIGHT DISORDER RESOURCES:

American Macular Degeneration Foundation (080) 198-3241

[facility] (411) 335-3237 www.[facility].org



GRIEF AND BEREAVEMENT RESOURCES:

 [facility] [place] [place] (931) 763-6574, [facility] (789) [phone]

 THE HOPE Connection (615) 099-0495, [facility] (413) [phone]

[facility] Bereavement Center (729) [phone], [place]



HEARING DISORDER RESOURCES:

[place] Telephone Access Program Deaf and Disabled Telecommunications Program (272) 496-4566 www.ddtp.cpu.ca.gov

[facility]earRx Hearing Centers ([place]) (559) 106-7509

Better Hearing Systems (050) 232-7908, [place]

GLAD ([facility] Agency on Deafness) (825) 625-9400 V/ TTY;

Hearing Aid Specialist (720) 053-0837, [facility] Hearing [facility] -low income hearing aid assistance (905) 879-3141 
www.[facility] Delawarehearingfoundation.org 

[facility] (675) [phone], [place]



HELP AT HOME  CAREGIVER SUPPORT:

 In Home Support Services  (Must have Medi-Mejia to be eligible)

(717) 141-8190 *Ask for a list of agencies that provide services to assist with care in the 
home.  Local Senior Centers also have listings of care providers.



HOME SAFETY MODIFICATIONS AND EQUIPMENT:

Senior centers have additional referrals.

[place] Housing and Community Investment Dept. Handyworker Program (low income) (977) 526-2127 or 
(225) 641-8527 Visit http://hcidla.[facility].org/low-income-sr for more information

National Seating and Mobility (506)804-9939 and/or (144)891-7959;

Forever Active (439) 140-8288 www.foreverTicket Hoymed.com

Stay Home Safe (492) 280-0394  www.Stayhomesafe.com

LIFE ALERT RESPONSE SYSTEM:

ShoutEmline Services 842-615-2839 www. Welcome Real-time

Life Alert 155-763-6475 www.lifealert.Human Longevity

Life Station 345-371-0914 www.Hipcampation.Human Longevity

Safe Return 222-510-4856 www.alz.or/safereturn

Cell Phones for Seniors www.ikvgo3kudrhuaodo.com



MEALS AND FOOD PROGRAMS:

[place] Meals on Wheels [phone]

[employer] Meals on Wheels [phone]

[facility] [phone]

[place] to the Homebound [phone]

[place] to the Homebound [phone]

[facility] to the Homebound [phone]

[facility] to the Homebound [phone]

[facility] [phone]

[facility] [phone]

ONE Generation [phone]

[facility] [phone]

[facility] [phone]

Meals on Wheels [phone] For all ages: $6.85/ meal w side. Delivered M-F from 10 am-1pm. 
Application and payment is done over the phone. Frozen meals available for weekends. 

Emergency Food [facility] [phone]

[facility]  [phone]

[facility] [phone]

[facility]- Brown bag lunches [phone]

[facility] [phone]



MEAL/GROCERY DELIVERY PROGRAMS:

[facility] Gourmet Meals [phone]- [facility]

[phone]- [facility]

Magic Kitchen [phone]

Moms Meals [phone] (ask Mendez for Discount

***Select grocery stores may provide delivery. 



MEDICAL INSURANCE SUPPORT SERVICES:

Center for Health Care Rights 356-666-9966

Health Insurance Counseling/Advocacy Programs (HICAP)-Must have Medicare. Offers counseling 
for Medi-Mejia eligibility [phone]

Department of Public  968-461-2861 www.[facility].ca.gov

Medicare 790-392-1490 www.socialsecurity.org

Social Security [phone]



SENIOR ACTIVITY PROGRAMS:

*Contact a local senior center, adult school, recreation facility or community college for 
education, fitness, recreation, and social programs. 

Aquatic Therapy and Adapted Exercise programs through [facility] [phone]

Encore at [facility] [phone] www.[facility]/encore

H2U- Senior Friends [phone]

[place] Senior Programs 193-688-9446 www.oasisnet.org

Suddenly 65 304-876-8395 www.ltccrfap32.Human Longevity



SENIOR CENTERS:

[facility] [phone]

[facility] [phone]

[facility] [phone]

[facility] [phone]

[facility] [phone]

[facility] [phone]

[facility] [phone]

[facility] [phone]

[facility] [phone]

[facility] [phone]

[facility] [phone]

[facility] [phone]

[facility] [phone]



TRANSPORTATION:

Local [facility] may have applications for transportation programs and additional 
resources. 

ACCESS Services 912-332-6844 Transportation for seniors and disabled persons 7 days a week 

requiring 254 hr. advance reservation. Must apply and register for program yousfi eligible. 

CITY RIDE 399-163-6130 or [phone] Transportation for seniors and persons with ADA 
card/metro disabled card in the [facility]. M-F only. Must register for services. 

ONE GENERATION  919.469.5486 Serves 65 years + in conjunction with city ride program. Must 
be registered with both programs.

A to B Transport [phone] Provides wheelchair/gurney van service.

Adult Medical Transport [phone] Accepts Medi-mejia with prior authorization. 

Care Van [phone] Provides wheelchair Transport. 

[facility] Wide Transportation [phone] Provides gurney service

[facility] [phone] Gurney Transport.

[facility] Transportation [phone] wheelchair & gurney transport

[facility] Transportation [phone] wheelchair & gurney transport

[place] Non-Emergency Transport [phone] wheelchair & gurney transport

[facility] Living Sells [phone] Short Term Transportation primarily for adults with   
disabilities on social security income. Nominal fee may apply and a reservation is required. 


[facility] Cab [phone] or [phone]

[facility] [phone]

[address] [place] [address] Services [phone] For additional programs & services 

VETERANS RESOURCES:

Submissions for Aid and Attendance should be done directly to Federal VA office locatd at : 
[address] [address] [phone]

National Caregiver Support Line [phone]

[facility] Services Field Office [phone]

[place] Department of  Affairs [phone]

Pension Information [phone]

## 2021-11-29 NOTE — NUR
WOUND CARE CONSULT: PT PRESENTS WITH DISCOLORATION TO LEFT HIP/LOWER BACK, SACRAL AND LEFT 
LOWER BUTTOCK STAGE 3 ULCERS, PRESENT ON ADMISSION. DR ALAYNA NIELSEN NOTIFIED OF SURGICAL 
CONSULT REQUEST. PT NOTED TO HAVE LOWER EXTREMITY EDEMA. RECOMMENDATIONS MADE FOR WOUND CARE 
AND SKIN PROTECTION. DISCUSSED WITH NURSING STAFF. MD IN AGREEMENT WITH PLAN OF CARE. No

## 2021-11-29 NOTE — NUR
TELE RN CLOSING NOTES 



PATIENT STILL LAYING AWAKE IN BED. A/O X2. PATIENT WITH REGULAR AND UNLABORED BREATHING ON 
ROOM AIR, TOLERATED WELL. NO SIGNS AND SYMPTOMS OF DISTRESS NOTED. NO COMPLAIN OF PAIN OR 
DISCOMFORT AT THIS TIME.  IV ACCESS LFA G #20 SL. ACCESS PATENT AND INTACT. SALO AV FISTULA 
SAFETY PRECAUTIONS ENFORCED WITH BED LOCKKED AND AT LOWEST POSITION SIDERAILS UP X2. CALL 
LIGHT WITHIN REACH AT ALL TIES. WILL ENDORSE CONTINUITY OF CARE TO DAY SHIFT NURSE.

## 2021-11-29 NOTE — NUR
TELE/RN OPENING NOTE



RECEIVED PATIENT RESTING IN BED. AWAKE, ALERT AND ORIENTED X 4. ABLE TO MAKE NEEDS KNOWN. 
DENIES PAIN AT THIS TIME. CONTINUES ON ROOM AIR WITH NO S/SX OF RESPIRATORY DISTRESS NOTED. 
IV ACCESS TO LEFT FOREARM #20G INTACT, PATENT AND SALINE LOCKED. RIGHT UPPER ARM AV FISTULA 
IN PLACE WITH POSITIVE BRUIT AND THRILL. PATIENT CURRENTLY RECEIVING DIALYSIS THAT STARTED 
AT APPROX. 18:45. CONTINUES TO REFUSE TELE MONITOR. CALL LIGHT WITHIN REACH. ASPIRATION, 
FALL AND SAFETY PRECAUTIONS MAINTAINED. WILL CONTINUE TO MONITOR.

## 2021-11-29 NOTE — NUR
PT RECEIVED



RESTING COMFORTABLY IN BED. NO S/S OR C/O PAIN OR DISTRESS NOTED. SIDE RAILS UP X2, CALL 
LIGHTS LEFT WITHIN REACH. WILL CONTINUE PLAN OF CARE.

## 2021-11-29 NOTE — NUR
CHANGE OF SHIFT REPORT



PT RESTING COMFORTABLY IN BED. NO S/S OR C/O PAIN OR DISTRESS NOTED. SIDE RAILS UP X2, CALL 
LIGHT LEFT WITHIN REACH. PT KEPT CLEAN, DRY, AND COMFORTABLE. NO SIGNIFICANT CHANGES SINCE 
PREVIOUS SHIFT. HD ONGOING WILL GIVE REPORT TO NOC RN.

## 2021-11-30 VITALS — SYSTOLIC BLOOD PRESSURE: 104 MMHG | DIASTOLIC BLOOD PRESSURE: 49 MMHG

## 2021-11-30 VITALS — SYSTOLIC BLOOD PRESSURE: 112 MMHG | DIASTOLIC BLOOD PRESSURE: 58 MMHG

## 2021-11-30 VITALS — SYSTOLIC BLOOD PRESSURE: 135 MMHG | DIASTOLIC BLOOD PRESSURE: 72 MMHG

## 2021-11-30 VITALS — SYSTOLIC BLOOD PRESSURE: 108 MMHG | DIASTOLIC BLOOD PRESSURE: 65 MMHG

## 2021-11-30 LAB
BASOPHILS # BLD AUTO: 0 K/UL (ref 0–0.2)
BASOPHILS NFR BLD AUTO: 0.5 % (ref 0–2)
BUN SERPL-MCNC: 40 MG/DL (ref 7–18)
CALCIUM SERPL-MCNC: 8.4 MG/DL (ref 8.5–10.1)
CHLORIDE SERPL-SCNC: 103 MMOL/L (ref 98–107)
CO2 SERPL-SCNC: 26 MMOL/L (ref 21–32)
CREAT SERPL-MCNC: 6.1 MG/DL (ref 0.6–1.3)
EOSINOPHIL NFR BLD AUTO: 0.7 % (ref 0–6)
GLUCOSE SERPL-MCNC: 85 MG/DL (ref 74–106)
HCT VFR BLD AUTO: 29 % (ref 39–51)
HGB BLD-MCNC: 8.7 G/DL (ref 13.5–17.5)
LYMPHOCYTES NFR BLD AUTO: 0.7 K/UL (ref 0.8–4.8)
LYMPHOCYTES NFR BLD AUTO: 13.7 % (ref 20–44)
MAGNESIUM SERPL-MCNC: 1.7 MG/DL (ref 1.8–2.4)
MCHC RBC AUTO-ENTMCNC: 30 G/DL (ref 31–36)
MCV RBC AUTO: 97 FL (ref 80–96)
MONOCYTES NFR BLD AUTO: 0.5 K/UL (ref 0.1–1.3)
MONOCYTES NFR BLD AUTO: 9.2 % (ref 2–12)
NEUTROPHILS # BLD AUTO: 3.9 K/UL (ref 1.8–8.9)
NEUTROPHILS NFR BLD AUTO: 75.9 % (ref 43–81)
PHOSPHATE SERPL-MCNC: 3.7 MG/DL (ref 2.5–4.9)
PLATELET # BLD AUTO: 133 K/UL (ref 150–450)
POTASSIUM SERPL-SCNC: 4.2 MMOL/L (ref 3.5–5.1)
RBC # BLD AUTO: 3.03 MIL/UL (ref 4.5–6)
SODIUM SERPL-SCNC: 137 MMOL/L (ref 136–145)
WBC NRBC COR # BLD AUTO: 5.1 K/UL (ref 4.3–11)

## 2021-11-30 RX ADMIN — Medication SCH EACH: at 06:43

## 2021-11-30 RX ADMIN — METOPROLOL TARTRATE SCH MG: 25 TABLET, FILM COATED ORAL at 17:00

## 2021-11-30 RX ADMIN — Medication SCH GM: at 08:56

## 2021-11-30 RX ADMIN — VALSARTAN SCH MG: 80 TABLET ORAL at 08:56

## 2021-11-30 RX ADMIN — FAMOTIDINE SCH MG: 20 TABLET, FILM COATED ORAL at 08:55

## 2021-11-30 RX ADMIN — PANTOPRAZOLE SODIUM SCH MG: 40 TABLET, DELAYED RELEASE ORAL at 08:55

## 2021-11-30 RX ADMIN — ASPIRIN 81 MG SCH MG: 81 TABLET ORAL at 08:54

## 2021-11-30 RX ADMIN — TAMSULOSIN HYDROCHLORIDE SCH MG: 0.4 CAPSULE ORAL at 21:16

## 2021-11-30 RX ADMIN — ATORVASTATIN CALCIUM SCH MG: 40 TABLET, FILM COATED ORAL at 21:16

## 2021-11-30 RX ADMIN — Medication SCH EACH: at 12:26

## 2021-11-30 RX ADMIN — Medication SCH EACH: at 17:11

## 2021-11-30 RX ADMIN — METOPROLOL TARTRATE SCH MG: 25 TABLET, FILM COATED ORAL at 08:55

## 2021-11-30 RX ADMIN — Medication SCH MG: at 08:55

## 2021-11-30 RX ADMIN — VALSARTAN SCH MG: 80 TABLET ORAL at 17:00

## 2021-11-30 RX ADMIN — INSULIN GLARGINE SCH UNIT: 100 INJECTION, SOLUTION SUBCUTANEOUS at 21:16

## 2021-11-30 RX ADMIN — Medication SCH EACH: at 21:16

## 2021-11-30 NOTE — NUR
TELE/RN OPENING NOTE



RECEIVED PATIENT RESTING IN BED. AWAKE, ALERT AND ORIENTED X 3. ABLE TO MAKE NEEDS KNOWN. 
PRIMARILY Polish SPEAKING. CONTINUES ON ROOM AIR WITH NO S/SX OF RESPIRATORY DISTRESS 
NOTED. IV ACCESS TO LEFT FOREARM #20G INTACT, PATENT AND SALINE LOCKED. RIGHT IJ HD CATH IN 
PLACE WITH CLEAN, DRY AND INTACT DRESSING. CONTINUES ON TELE MONITOR WITH CURRENT READING SR 
HR 76. CALL LIGHT WITHIN REACH. ASPIRATION, FALL AND SAFETY PRECAUTIONS MAINTAINED. WILL 
CONTINUE TO MONITOR.

## 2021-11-30 NOTE — NUR
TELE/RN NOTE



ATTEMPTED TO CHANGE IV DRESSING D/T SOILED/DRAINAGE NOTED. PATIENT REFUSING RN TO TOUCH IV 
SITE. WAVING AT RN TO GO - SAYING HE WANTS TO SLEEP. WILL ATTEMPT TO CHANGE IN AM.

## 2021-11-30 NOTE — NUR
TELE/RN CLOSING NOTE



PATIENT CURRENTLY RESTING IN BED. AWAKE, ALERT AND ORIENTED X 4. ABLE TO MAKE NEEDS KNOWN. 
DENIES PAIN AT THIS TIME. CONTINUES ON ROOM AIR WITH NO S/SX OF RESPIRATORY DISTRESS NOTED. 
IV ACCESS TO LEFT FOREARM #20G INTACT, PATENT AND SALINE LOCKED. RIGHT IJ HD CATH IN PLACE 
WITH DRESSING CLEAN, DRY AND INTACT. COMPETED DIALYSIS LAST NIGHT WITH 2L REMOVED. CONTINUES 
ON TELE MONITOR WITH CURRENT READING SR WITH PVCS AND PACS. CALL LIGHT WITHIN REACH. 
ASPIRATION, FALL AND SAFETY PRECAUTIONS MAINTAINED. WILL ENDORSE PLAN OF CARE TO ONCOMING 
SHIFT.

## 2021-12-01 VITALS — DIASTOLIC BLOOD PRESSURE: 54 MMHG | SYSTOLIC BLOOD PRESSURE: 112 MMHG

## 2021-12-01 VITALS — DIASTOLIC BLOOD PRESSURE: 64 MMHG | SYSTOLIC BLOOD PRESSURE: 127 MMHG

## 2021-12-01 VITALS — SYSTOLIC BLOOD PRESSURE: 114 MMHG | DIASTOLIC BLOOD PRESSURE: 53 MMHG

## 2021-12-01 VITALS — DIASTOLIC BLOOD PRESSURE: 52 MMHG | SYSTOLIC BLOOD PRESSURE: 106 MMHG

## 2021-12-01 VITALS — DIASTOLIC BLOOD PRESSURE: 63 MMHG | SYSTOLIC BLOOD PRESSURE: 130 MMHG

## 2021-12-01 LAB
BASOPHILS # BLD AUTO: 0 K/UL (ref 0–0.2)
BASOPHILS NFR BLD AUTO: 0.3 % (ref 0–2)
BUN SERPL-MCNC: 56 MG/DL (ref 7–18)
CALCIUM SERPL-MCNC: 8.4 MG/DL (ref 8.5–10.1)
CHLORIDE SERPL-SCNC: 99 MMOL/L (ref 98–107)
CO2 SERPL-SCNC: 24 MMOL/L (ref 21–32)
CREAT SERPL-MCNC: 7.5 MG/DL (ref 0.6–1.3)
EOSINOPHIL NFR BLD AUTO: 0.3 % (ref 0–6)
GLUCOSE SERPL-MCNC: 109 MG/DL (ref 74–106)
HCT VFR BLD AUTO: 32 % (ref 39–51)
HGB BLD-MCNC: 9.2 G/DL (ref 13.5–17.5)
LYMPHOCYTES NFR BLD AUTO: 0.7 K/UL (ref 0.8–4.8)
LYMPHOCYTES NFR BLD AUTO: 7.2 % (ref 20–44)
MAGNESIUM SERPL-MCNC: 1.9 MG/DL (ref 1.8–2.4)
MCHC RBC AUTO-ENTMCNC: 29 G/DL (ref 31–36)
MCV RBC AUTO: 99 FL (ref 80–96)
MONOCYTES NFR BLD AUTO: 0.7 K/UL (ref 0.1–1.3)
MONOCYTES NFR BLD AUTO: 7.5 % (ref 2–12)
NEUTROPHILS # BLD AUTO: 7.6 K/UL (ref 1.8–8.9)
NEUTROPHILS NFR BLD AUTO: 84.7 % (ref 43–81)
PHOSPHATE SERPL-MCNC: 4.8 MG/DL (ref 2.5–4.9)
PLATELET # BLD AUTO: 146 K/UL (ref 150–450)
POTASSIUM SERPL-SCNC: 5.2 MMOL/L (ref 3.5–5.1)
RBC # BLD AUTO: 3.18 MIL/UL (ref 4.5–6)
SODIUM SERPL-SCNC: 131 MMOL/L (ref 136–145)
WBC NRBC COR # BLD AUTO: 9 K/UL (ref 4.3–11)

## 2021-12-01 RX ADMIN — VALSARTAN SCH MG: 80 TABLET ORAL at 08:14

## 2021-12-01 RX ADMIN — FAMOTIDINE SCH MG: 20 TABLET, FILM COATED ORAL at 08:12

## 2021-12-01 RX ADMIN — Medication SCH EACH: at 06:36

## 2021-12-01 RX ADMIN — Medication SCH EACH: at 11:41

## 2021-12-01 RX ADMIN — Medication SCH GM: at 08:16

## 2021-12-01 RX ADMIN — METOPROLOL TARTRATE SCH MG: 25 TABLET, FILM COATED ORAL at 17:00

## 2021-12-01 RX ADMIN — METOPROLOL TARTRATE SCH MG: 25 TABLET, FILM COATED ORAL at 08:14

## 2021-12-01 RX ADMIN — Medication SCH EACH: at 06:35

## 2021-12-01 RX ADMIN — Medication SCH EACH: at 17:13

## 2021-12-01 RX ADMIN — VALSARTAN SCH MG: 80 TABLET ORAL at 17:00

## 2021-12-01 RX ADMIN — Medication SCH MG: at 08:14

## 2021-12-01 RX ADMIN — Medication SCH MG: at 08:13

## 2021-12-01 RX ADMIN — ASPIRIN 81 MG SCH MG: 81 TABLET ORAL at 08:13

## 2021-12-01 RX ADMIN — PANTOPRAZOLE SODIUM SCH MG: 40 TABLET, DELAYED RELEASE ORAL at 07:37

## 2021-12-01 NOTE — NUR
RN OPENING NOTES



RECEIVED PATIENT IN BED. AWAKE, ALERT AND ORIENTED X 3. ABLE TO MAKE NEEDS KNOWN. Wallisian 
SPEAKING.ON ROOM AIR WITH NO S/SX OF RESPIRATORY DISTRESS NOTED. IV ACCESS TO LEFT FOREARM 
#20G INTACT, PATENT AND FLUSHES WELL. RIGHT IJ HD CATH IN PLACE WITH CLEAN, DRY AND INTACT 
DRESSING. CALL LIGHT WITHIN REACH. ASPIRATION, FALL AND SAFETY PRECAUTIONS MAINTAINED. BED 
ON LOWEST LOCKED POSITION, SIDE RAILS UP X 2, CALL LIGHT WITHIN EASY REACH. WILL CONTINUE TO 
MONITOR ACCORDINGLY.

## 2021-12-01 NOTE — NUR
RN NOTES



PATIENT IS FOR DISCHARGE TODAY TO [facility], REPORT GIVEN TO ANNABELLA. PATIENT WILL BE 
ADMITTED AT ROOM 44C.

## 2021-12-01 NOTE — NUR
TELE/RN NOTE



TELE MONITOR SHOWS LEADS ARE OFF. PATIENT REFUSING TO BE TOUCHED OR LET US CHANGE LEADS OR 
DIAPER. ATTEMPTED WITH  WITH PATIENT REFUSING. WILL CONTINUE TO MONITOR.

## 2021-12-01 NOTE — NUR
RN DISCHARGE NOTES



DISCHARGED PATIENT IN STABLE CONDITION. VITAL SIGNS WITHIN NORMAL LIMITS. DISCHARGE 
INSTRUCTIONS AND INFORMATION/REPORT GIVEN TO ANNABELLA SIMEON OF [facility] (). 
IV ACCESS REMOVED, COVERED WITH GAUZE. NO BLEEDING NOTED. SON AWARE OF DISCHARGE FACILITY 
PER . BELONGINGS ACCOUNTED AND SIGNED FOR BY 2 RN. PATIENT UNABLE TO SIGN.  
PATIENT WAS PICKED UP BY 2 EMT FROM [facility]. LEFT UNIT IN STABLE CONDITION. MD AND CHARGE NURSE 
AWARE OF DISCHARGE.

## 2021-12-01 NOTE — NUR
TELE/RN CLOSING NOTE



PATIENT CURRENTLY SLEEPING IN BED. ALERT AND ORIENTED X 3. ABLE TO MAKE NEEDS KNOWN. 
PRIMARILY Portuguese SPEAKING. CONTINUES ON ROOM AIR WITH NO S/SX OF RESPIRATORY DISTRESS 
NOTED. IV ACCESS TO LEFT FOREARM #20G INTACT- PATIENT REFUSING TO HAVE DRESSING CHANGED OR 
IV FLUSHED. RIGHT IJ HD CATH IN PLACE WITH CLEAN, DRY AND INTACT DRESSING. CURRENTLY 
REFUSING TELE MONITOR LEAD CHANGE. CALL LIGHT WITHIN REACH. ASPIRATION, FALL AND SAFETY 
PRECAUTIONS MAINTAINED. WILL ENDORSE PLAN OF CARE TO ONCOMING SHIFT.

## 2021-12-07 NOTE — NUR
DIDIER received call from APS workerArmida [phone] asking for pt.'s apt number. DIDIER 
addressed Armida's questions. DIDIER will remain available as needed.